# Patient Record
Sex: MALE | NOT HISPANIC OR LATINO
[De-identification: names, ages, dates, MRNs, and addresses within clinical notes are randomized per-mention and may not be internally consistent; named-entity substitution may affect disease eponyms.]

---

## 2017-03-06 ENCOUNTER — RECORDS - HEALTHEAST (OUTPATIENT)
Dept: ADMINISTRATIVE | Facility: OTHER | Age: 72
End: 2017-03-06

## 2017-03-13 ENCOUNTER — RECORDS - HEALTHEAST (OUTPATIENT)
Dept: ADMINISTRATIVE | Facility: OTHER | Age: 72
End: 2017-03-13

## 2017-03-15 ENCOUNTER — RECORDS - HEALTHEAST (OUTPATIENT)
Dept: ADMINISTRATIVE | Facility: OTHER | Age: 72
End: 2017-03-15

## 2017-03-16 ENCOUNTER — RECORDS - HEALTHEAST (OUTPATIENT)
Dept: ADMINISTRATIVE | Facility: OTHER | Age: 72
End: 2017-03-16

## 2017-03-17 ENCOUNTER — RECORDS - HEALTHEAST (OUTPATIENT)
Dept: ADMINISTRATIVE | Facility: OTHER | Age: 72
End: 2017-03-17

## 2017-03-22 ENCOUNTER — RECORDS - HEALTHEAST (OUTPATIENT)
Dept: ADMINISTRATIVE | Facility: OTHER | Age: 72
End: 2017-03-22

## 2017-04-14 ENCOUNTER — RECORDS - HEALTHEAST (OUTPATIENT)
Dept: ADMINISTRATIVE | Facility: OTHER | Age: 72
End: 2017-04-14

## 2017-06-16 ENCOUNTER — RECORDS - HEALTHEAST (OUTPATIENT)
Dept: ADMINISTRATIVE | Facility: OTHER | Age: 72
End: 2017-06-16

## 2017-07-11 ENCOUNTER — RECORDS - HEALTHEAST (OUTPATIENT)
Dept: ADMINISTRATIVE | Facility: OTHER | Age: 72
End: 2017-07-11

## 2017-11-02 ENCOUNTER — RECORDS - HEALTHEAST (OUTPATIENT)
Dept: ADMINISTRATIVE | Facility: OTHER | Age: 72
End: 2017-11-02

## 2018-02-14 ENCOUNTER — RECORDS - HEALTHEAST (OUTPATIENT)
Dept: ADMINISTRATIVE | Facility: OTHER | Age: 73
End: 2018-02-14

## 2018-03-19 ENCOUNTER — RECORDS - HEALTHEAST (OUTPATIENT)
Dept: ADMINISTRATIVE | Facility: OTHER | Age: 73
End: 2018-03-19

## 2018-03-26 ENCOUNTER — RECORDS - HEALTHEAST (OUTPATIENT)
Dept: ADMINISTRATIVE | Facility: OTHER | Age: 73
End: 2018-03-26

## 2018-04-19 ENCOUNTER — RECORDS - HEALTHEAST (OUTPATIENT)
Dept: ADMINISTRATIVE | Facility: OTHER | Age: 73
End: 2018-04-19

## 2018-06-05 ENCOUNTER — RECORDS - HEALTHEAST (OUTPATIENT)
Dept: ADMINISTRATIVE | Facility: OTHER | Age: 73
End: 2018-06-05

## 2018-06-08 ENCOUNTER — OFFICE VISIT - HEALTHEAST (OUTPATIENT)
Dept: RADIATION ONCOLOGY | Facility: CLINIC | Age: 73
End: 2018-06-08

## 2018-06-08 DIAGNOSIS — N28.89 RIGHT KIDNEY MASS: ICD-10-CM

## 2018-06-08 DIAGNOSIS — C61 MALIGNANT NEOPLASM OF PROSTATE (H): ICD-10-CM

## 2018-06-08 RX ORDER — TRAVOPROST OPHTHALMIC SOLUTION 0.04 MG/ML
SOLUTION OPHTHALMIC
Status: SHIPPED | COMMUNITY
Start: 2017-01-16

## 2018-06-08 RX ORDER — TRIAMTERENE/HYDROCHLOROTHIAZID 37.5-25 MG
TABLET ORAL
Status: SHIPPED | COMMUNITY
Start: 2016-12-28

## 2018-06-08 RX ORDER — AMOXICILLIN 500 MG/1
500 TABLET, FILM COATED ORAL
Status: SHIPPED | COMMUNITY
Start: 2018-06-08

## 2018-06-08 RX ORDER — ALLOPURINOL 100 MG/1
TABLET ORAL
Status: SHIPPED | COMMUNITY
Start: 2017-01-19

## 2018-06-08 RX ORDER — LANOLIN ALCOHOL/MO/W.PET/CERES
400 CREAM (GRAM) TOPICAL
Status: SHIPPED | COMMUNITY
Start: 2018-06-08

## 2018-06-08 RX ORDER — ASCORBIC ACID 500 MG
500 TABLET ORAL
Status: SHIPPED | COMMUNITY
Start: 2018-06-08

## 2018-06-08 RX ORDER — LISINOPRIL 20 MG/1
TABLET ORAL
Status: SHIPPED | COMMUNITY
Start: 2017-01-19

## 2018-06-08 RX ORDER — BRIMONIDINE TARTRATE 1.5 MG/ML
SOLUTION/ DROPS OPHTHALMIC
Status: SHIPPED | COMMUNITY
Start: 2017-01-20

## 2018-06-08 RX ORDER — ATENOLOL 25 MG/1
25 TABLET ORAL
Status: SHIPPED | COMMUNITY
Start: 2018-06-08

## 2021-06-01 VITALS — WEIGHT: 205.2 LBS

## 2021-06-16 PROBLEM — C61 MALIGNANT NEOPLASM OF PROSTATE (H): Status: ACTIVE | Noted: 2018-06-08

## 2021-06-16 PROBLEM — N28.89 RIGHT KIDNEY MASS: Status: ACTIVE | Noted: 2018-06-08

## 2021-06-18 NOTE — PROGRESS NOTES
Patient here ambulatory accompanied by wife for radiation consult for kidney lesions.  Per patient some lesions were seen on one of his kidneys during a follow up scan for his prostate cancer.  Patient was sent to a kidney specialist but not no biopsy or tissue diagnosis has been done.  Written information given to patient for review: ACS RT book, [facility] RT handouts, RT to pelvis handout, HE class schedule, doctor bio card, team photo sheet and cancer care welcome letter.  Seen by Dr. Simpson.  Further orders per physician.

## 2021-06-18 NOTE — CONSULTS
Consults   [facility] Radiation Oncology Consult Note     Patient: Eligio Bey  MRN: [id number]  Date of Service: 06/08/2018            The patient is a 73-year-old gentleman with prior history of prostate cancer status post surgery and the recent finding of 2 small right kidney mass suspicious for malignancy.  The patient is self-referred to radiation oncology for evaluation and discussion of possible management options for his kidney mass by stereotactic radiotherapy.    HISTORY OF PRESENT ILLNESS:   Mr. Bey is a 73 y.o. male who had a history of prostate cancer diagnosed initially at beginning of 2017, status post robotic assisted radical prostatectomy with bilateral node dissection by Dr. Abelardo Bhatti on March 15, 2017.  The pathology revealed adenocarcinoma with combined Woodland grade 3+4 = 7 in the left and 4+4 = 8 in the right lobe of the prostate gland with no evidence of extracapsular extension nor involvement of seminal vesicle.  There was a focal involvement of cancer in the left bladder neck.  One removed pelvic lymph nodes showed no evidence of metastatic disease.  The patient was therefore staged as [id number].  Post operatively the patient has been recovering well.  His PSA has dropped to the undetectable level.  In the process of follow-up patient with staging CTs abdomen pelvics which unfortunately reviewed 2 small kidney mass since December 2006 measuring approximately 1.4 cm and 1.1 cm.  Repeat CT scan on June 5, 2018 revealed slightly improved enlargement of the right kidney mass measuring 1.7 and 1.1 cm suspicious for renal cell carcinoma.  Different treatment options has been discussed with the patient by his urologist.  The patient is now referred to radiation oncology for evaluation and discussion of possible management options including stereotactic radiotherapy by [employer].    CHEMOTHERAPY HISTORY: Concurrent Chemotherapy: No    RADIATION THERAPY HISTORY: Prior Radiation:  No    IMPLANTED CARDIAC DEVICE: none     Current Outpatient Prescriptions   Medication Sig Dispense Refill     allopurinol (ZYLOPRIM) 100 MG tablet TK 1 T PO BID       amoxicillin (AMOXIL) 500 MG tablet Take 500 mg by mouth.       ascorbic acid, vitamin C, (VITAMIN C) 500 MG tablet Take 500 mg by mouth.       atenolol (TENORMIN) 25 MG tablet Take 25 mg by mouth.       brimonidine (ALPHAGAN) 0.15 % ophthalmic solution INT 1 GTT IN OU BID       cholecalciferol, vitamin D3, 1,000 unit capsule Take 1,000 Units by mouth.       folic acid (FOLVITE) 400 MCG tablet Take 400 mcg by mouth.       lisinopril (PRINIVIL,ZESTRIL) 20 MG tablet TK 1 T PO D       MULTIVITAMIN ORAL Take by mouth.       travoprost (TRAVATAN Z) 0.004 % ophthalmic drops INT 1 GTT INTO OU HS       triamterene-hydrochlorothiazide (MAXZIDE-25) 37.5-25 mg per tablet TK 1 T PO D       No current facility-administered medications for this visit.      Past Medical History:   Diagnosis Date     Hypertension      Prostate cancer      Past Surgical History:   Procedure Laterality Date     APPENDECTOMY       PROSTATE SURGERY       TOTAL HIP ARTHROPLASTY Bilateral      Review of patient's allergies indicates no known allergies.  No family history on file.  Social History     Social History     Marital status:      Spouse name: N/A     Number of children: N/A     Years of education: N/A     Occupational History     Not on file.     Social History Main Topics     Smoking status: Former Smoker     Smokeless tobacco: Not on file      Comment: quit 36 years ago     Alcohol use Yes      Comment: 4 drinks     Drug use: Not on file     Sexual activity: Not on file     Other Topics Concern     Not on file     Social History Narrative     No narrative on file        Review of Systems:      General  General (WDL): All general elements are within defined limits  EENT  ENT (WDL): Exceptions to WDL  Glasses or Contacts: Yes - Chronic (Greater than 3 months)  (glasses)  Respiratory   Respiratory (WDL): All respiratory elements are within defined limits  Cardiovascular  Cardiovascular (WDL): All cardiovascular elements are within defined limits  Endocrine  Endocrine (WDL): All endocrine elements are within defined limits  Gastrointestinal  Gastrointestinal (WDL): All gastrointestinal elements are within defined limits  Musculoskeletal  Musculoskeletal (WDL): All musculoskeletal elements are within defined limits  Integumentary                  Neurological  Neurological (WDL): All neurological elements are within defined limits  Dominant Hand: Right  Psychological/Emotional   Psychological/Emotional (WDL): All psychological/emotional elements are within defined limits  Hematological/Lymphatic  Hematological/Lymphatic (WDL): All hematological/lymphatic elements are within defined limits  Dermatologic  Dermatologic (WDL): All dermatological elements are within defined limits  Genitourinary/Reproductive  Genitourinary/Reproductive (WDL): Exceptions to WDL  Urinary Urgency: Yes - Recent (Less than 3 months)  Reproductive     Pain              Currently in Pain: No/denies  Accompanied by     Imaging: Reviewed    Pathology: Revealed    ECOG Peformance Status  ECOG Performance Status: 0  Distress Assessment Score: No distress      Objective:     PHYSICAL EXAMINATION:    /76  Pulse (!) 52  Temp 98.2  F (36.8  C) (Oral)   Wt 205 lb 3.2 oz (93.1 kg)  SpO2 98%    Gen: Alert, in NAD  Eyes: PERRL, EOMI, sclera anicteric  Neck: Supple, full ROM, no LAD  Pulm: No wheezing, stridor or respiratory distress  CV: Well-perfused, no cyanosis, no pedal edema  Abdominal: BS+, soft, nontender, nondistended, no hepatomegaly  Back: No step-offs or pain to palpation along the thoracolumbar spine  Rectal: Deferred  : Deferred  Musculoskeletal: Normal muscle bulk and tone  Skin: Normal color and turgor  Neurologic: A/Ox3, CN II-XII intact, normal gait and station  Psychiatric: Appropriate  mood and affect     Impression     Prior history of prostate cancer status post surgery and the recent finding of 2 small right kidney mass suspicious for malignancy.     Assessment & Plan:     I have personally reviewed his upcoming medical record today.  I have also reviewed his most recent radiology study including CT scan.  The possible treatment options including clinical observation, surgery, cryotherapy, HIFU, and radiation therapy has been discussed with patient in detail and at great length.  This is a 73-year-old gentleman with a new finding of 2 small right kidney mass from CT scan suspicious for malignancy.  There is no biopsy to confirm the diagnosis.  I have informed the patient the standard treatment for kidney cancer would be surgery.  The cryotherapy and HIFU will be a good alternative.  There is only a limited data for stereotactic radiosurgery for kidney cancer.  However the limited data suggested a good local control by radiation therapy.  The stereotactic radiotherapy usually is considered for the patient who was not a surgical candidate and declined options of surgery.  The patient is in a reasonable good health status and I think the patient is a good candidate for surgical procedure.  Therefore I think the best option for the patient will be continued evaluation by urologist for follow-up and consideration of surgery.  The patient understands well and will contact his urologist for further evaluation.    I spent approximately 45 minutes today with the patient and 80% time was used for counseling.        Ella Simpson MD, PhD  Department of Radiation Oncology   [facility]  Tel: [phone]  Page: [phone]    [facility]  [address] [place] [address]     [place] [id number] [facility]    [place] [address]    CC:  Patient Care Team:  Ed Cuadra MD as PCP - General  Ella Simpson MD as Physician (Radiation Oncology)  Abelardo Bhatti MD (Urology)